# Patient Record
Sex: FEMALE | Race: WHITE | Employment: OTHER | URBAN - METROPOLITAN AREA
[De-identification: names, ages, dates, MRNs, and addresses within clinical notes are randomized per-mention and may not be internally consistent; named-entity substitution may affect disease eponyms.]

---

## 2017-08-10 ENCOUNTER — HOSPITAL ENCOUNTER (OUTPATIENT)
Dept: CT IMAGING | Age: 67
Discharge: HOME | End: 2017-08-10
Attending: SURGERY
Payer: MEDICARE

## 2017-08-10 VITALS — HEIGHT: 64 IN | WEIGHT: 162 LBS | BODY MASS INDEX: 27.66 KG/M2

## 2017-08-10 DIAGNOSIS — Z72.0 TOBACCO ABUSE: ICD-10-CM

## 2017-08-10 LAB — CREAT BLD-MCNC: 0.9 MG/DL (ref 0.6–1)

## 2017-08-10 PROCEDURE — 82565 ASSAY OF CREATININE: CPT

## 2017-08-10 PROCEDURE — 74011636320 HC RX REV CODE- 636/320: Performed by: SURGERY

## 2017-08-10 PROCEDURE — 74011000258 HC RX REV CODE- 258: Performed by: SURGERY

## 2017-08-10 PROCEDURE — 75635 CT ANGIO ABDOMINAL ARTERIES: CPT

## 2017-08-10 RX ORDER — SODIUM CHLORIDE 0.9 % (FLUSH) 0.9 %
10 SYRINGE (ML) INJECTION
Status: COMPLETED | OUTPATIENT
Start: 2017-08-10 | End: 2017-08-10

## 2017-08-10 RX ADMIN — IOPAMIDOL 125 ML: 755 INJECTION, SOLUTION INTRAVENOUS at 10:17

## 2017-08-10 RX ADMIN — Medication 10 ML: at 10:17

## 2017-08-10 RX ADMIN — SODIUM CHLORIDE 100 ML: 900 INJECTION, SOLUTION INTRAVENOUS at 10:17

## 2017-09-05 ENCOUNTER — HOSPITAL ENCOUNTER (OUTPATIENT)
Dept: LAB | Age: 67
Discharge: HOME | End: 2017-09-05
Payer: MEDICARE

## 2017-09-05 DIAGNOSIS — I75.021 ATHEROEMBOLISM OF RIGHT LOWER EXTREMITY (HCC): ICD-10-CM

## 2017-09-05 LAB
ANION GAP SERPL CALC-SCNC: 10 MMOL/L (ref 7–16)
BASOPHILS # BLD: 0 K/UL (ref 0–0.2)
BASOPHILS NFR BLD: 1 % (ref 0–2)
BUN SERPL-MCNC: 12 MG/DL (ref 8–23)
CALCIUM SERPL-MCNC: 9.3 MG/DL (ref 8.3–10.4)
CHLORIDE SERPL-SCNC: 104 MMOL/L (ref 98–107)
CO2 SERPL-SCNC: 28 MMOL/L (ref 21–32)
CREAT SERPL-MCNC: 0.98 MG/DL (ref 0.6–1)
DIFFERENTIAL METHOD BLD: ABNORMAL
EOSINOPHIL # BLD: 0.1 K/UL (ref 0–0.8)
EOSINOPHIL NFR BLD: 2 % (ref 0.5–7.8)
ERYTHROCYTE [DISTWIDTH] IN BLOOD BY AUTOMATED COUNT: 13.6 % (ref 11.9–14.6)
GLUCOSE SERPL-MCNC: 127 MG/DL (ref 65–100)
HCT VFR BLD AUTO: 42.5 % (ref 35.8–46.3)
HGB BLD-MCNC: 14.8 G/DL (ref 11.7–15.4)
IMM GRANULOCYTES # BLD: 0 K/UL (ref 0–0.5)
IMM GRANULOCYTES NFR BLD: 0.1 % (ref 0–5)
LYMPHOCYTES # BLD: 3 K/UL (ref 0.5–4.6)
LYMPHOCYTES NFR BLD: 41 % (ref 13–44)
MCH RBC QN AUTO: 33.7 PG (ref 26.1–32.9)
MCHC RBC AUTO-ENTMCNC: 34.8 G/DL (ref 31.4–35)
MCV RBC AUTO: 96.8 FL (ref 79.6–97.8)
MONOCYTES # BLD: 0.6 K/UL (ref 0.1–1.3)
MONOCYTES NFR BLD: 8 % (ref 4–12)
NEUTS SEG # BLD: 3.5 K/UL (ref 1.7–8.2)
NEUTS SEG NFR BLD: 48 % (ref 43–78)
PLATELET # BLD AUTO: 251 K/UL (ref 150–450)
PMV BLD AUTO: 10.8 FL (ref 10.8–14.1)
POTASSIUM SERPL-SCNC: 3.5 MMOL/L (ref 3.5–5.1)
RBC # BLD AUTO: 4.39 M/UL (ref 4.05–5.25)
SODIUM SERPL-SCNC: 142 MMOL/L (ref 136–145)
WBC # BLD AUTO: 7.2 K/UL (ref 4.3–11.1)

## 2017-09-05 PROCEDURE — 85025 COMPLETE CBC W/AUTO DIFF WBC: CPT | Performed by: SURGERY

## 2017-09-05 PROCEDURE — 36415 COLL VENOUS BLD VENIPUNCTURE: CPT | Performed by: SURGERY

## 2017-09-05 PROCEDURE — 80048 BASIC METABOLIC PNL TOTAL CA: CPT | Performed by: SURGERY

## 2017-09-12 ENCOUNTER — HOSPITAL ENCOUNTER (OUTPATIENT)
Age: 67
Setting detail: OUTPATIENT SURGERY
Discharge: HOME | End: 2017-09-12
Attending: SURGERY | Admitting: SURGERY
Payer: MEDICARE

## 2017-09-12 ENCOUNTER — APPOINTMENT (OUTPATIENT)
Dept: INTERVENTIONAL RADIOLOGY/VASCULAR | Age: 67
End: 2017-09-12
Attending: SURGERY
Payer: MEDICARE

## 2017-09-12 ENCOUNTER — ANESTHESIA EVENT (OUTPATIENT)
Dept: SURGERY | Age: 67
End: 2017-09-12
Payer: MEDICARE

## 2017-09-12 ENCOUNTER — ANESTHESIA (OUTPATIENT)
Dept: SURGERY | Age: 67
End: 2017-09-12
Payer: MEDICARE

## 2017-09-12 VITALS
OXYGEN SATURATION: 94 % | HEART RATE: 73 BPM | WEIGHT: 163 LBS | RESPIRATION RATE: 17 BRPM | BODY MASS INDEX: 27.98 KG/M2 | SYSTOLIC BLOOD PRESSURE: 150 MMHG | DIASTOLIC BLOOD PRESSURE: 73 MMHG | TEMPERATURE: 97.7 F

## 2017-09-12 DIAGNOSIS — I75.021 ATHEROEMBOLISM OF RIGHT LOWER EXTREMITY (HCC): ICD-10-CM

## 2017-09-12 PROBLEM — I70.211 ATHEROSCLEROSIS OF NATIVE ARTERIES OF EXTREMITIES WITH INTERMITTENT CLAUDICATION, RIGHT LEG (HCC): Status: ACTIVE | Noted: 2017-09-12

## 2017-09-12 PROBLEM — I77.1 ILIAC ARTERY STENOSIS, RIGHT (HCC): Status: ACTIVE | Noted: 2017-09-12

## 2017-09-12 LAB — ACT BLD: 114 SECS (ref 70–128)

## 2017-09-12 PROCEDURE — 76060000033 HC ANESTHESIA 1 TO 1.5 HR: Performed by: SURGERY

## 2017-09-12 PROCEDURE — C1876 STENT, NON-COA/NON-COV W/DEL: HCPCS

## 2017-09-12 PROCEDURE — C1894 INTRO/SHEATH, NON-LASER: HCPCS

## 2017-09-12 PROCEDURE — 74011250636 HC RX REV CODE- 250/636

## 2017-09-12 PROCEDURE — C1887 CATHETER, GUIDING: HCPCS

## 2017-09-12 PROCEDURE — C1769 GUIDE WIRE: HCPCS

## 2017-09-12 PROCEDURE — 77030032500 HC CATH SZ PIG PRFRM MRTM -B

## 2017-09-12 PROCEDURE — 74011250636 HC RX REV CODE- 250/636: Performed by: SURGERY

## 2017-09-12 PROCEDURE — 74011636320 HC RX REV CODE- 636/320: Performed by: SURGERY

## 2017-09-12 PROCEDURE — 85347 COAGULATION TIME ACTIVATED: CPT

## 2017-09-12 PROCEDURE — 77030013794 HC KT TRNSDUC BLD EDWD -B

## 2017-09-12 PROCEDURE — 37221 IR PLACE STNT ILIAC /  PTA INIT: CPT

## 2017-09-12 PROCEDURE — 74011000250 HC RX REV CODE- 250: Performed by: SURGERY

## 2017-09-12 PROCEDURE — 74011250637 HC RX REV CODE- 250/637: Performed by: ANESTHESIOLOGY

## 2017-09-12 PROCEDURE — 74011250636 HC RX REV CODE- 250/636: Performed by: ANESTHESIOLOGY

## 2017-09-12 RX ORDER — IODIXANOL 320 MG/ML
10-200 INJECTION, SOLUTION INTRAVASCULAR
Status: COMPLETED | OUTPATIENT
Start: 2017-09-12 | End: 2017-09-12

## 2017-09-12 RX ORDER — SODIUM CHLORIDE, SODIUM LACTATE, POTASSIUM CHLORIDE, CALCIUM CHLORIDE 600; 310; 30; 20 MG/100ML; MG/100ML; MG/100ML; MG/100ML
100 INJECTION, SOLUTION INTRAVENOUS CONTINUOUS
Status: CANCELLED | OUTPATIENT
Start: 2017-09-12

## 2017-09-12 RX ORDER — PROTAMINE SULFATE 10 MG/ML
INJECTION, SOLUTION INTRAVENOUS AS NEEDED
Status: DISCONTINUED | OUTPATIENT
Start: 2017-09-12 | End: 2017-09-12 | Stop reason: HOSPADM

## 2017-09-12 RX ORDER — MIDAZOLAM HYDROCHLORIDE 1 MG/ML
INJECTION, SOLUTION INTRAMUSCULAR; INTRAVENOUS AS NEEDED
Status: DISCONTINUED | OUTPATIENT
Start: 2017-09-12 | End: 2017-09-12 | Stop reason: HOSPADM

## 2017-09-12 RX ORDER — MORPHINE SULFATE 2 MG/ML
2 INJECTION, SOLUTION INTRAMUSCULAR; INTRAVENOUS
Status: DISCONTINUED | OUTPATIENT
Start: 2017-09-12 | End: 2017-09-12 | Stop reason: HOSPADM

## 2017-09-12 RX ORDER — SODIUM CHLORIDE 9 MG/ML
50 INJECTION, SOLUTION INTRAVENOUS CONTINUOUS
Status: DISCONTINUED | OUTPATIENT
Start: 2017-09-12 | End: 2017-09-12 | Stop reason: HOSPADM

## 2017-09-12 RX ORDER — ACETAMINOPHEN 325 MG/1
650 TABLET ORAL
Status: DISCONTINUED | OUTPATIENT
Start: 2017-09-12 | End: 2017-09-12 | Stop reason: HOSPADM

## 2017-09-12 RX ORDER — DIPHENHYDRAMINE HYDROCHLORIDE 50 MG/ML
12.5 INJECTION, SOLUTION INTRAMUSCULAR; INTRAVENOUS ONCE
Status: CANCELLED | OUTPATIENT
Start: 2017-09-12 | End: 2017-09-12

## 2017-09-12 RX ORDER — SODIUM CHLORIDE 0.9 % (FLUSH) 0.9 %
5-10 SYRINGE (ML) INJECTION AS NEEDED
Status: CANCELLED | OUTPATIENT
Start: 2017-09-12

## 2017-09-12 RX ORDER — OXYCODONE AND ACETAMINOPHEN 5; 325 MG/1; MG/1
1 TABLET ORAL AS NEEDED
Status: CANCELLED | OUTPATIENT
Start: 2017-09-12

## 2017-09-12 RX ORDER — CEFAZOLIN SODIUM IN 0.9 % NACL 2 G/50 ML
2 INTRAVENOUS SOLUTION, PIGGYBACK (ML) INTRAVENOUS ONCE
Status: COMPLETED | OUTPATIENT
Start: 2017-09-12 | End: 2017-09-12

## 2017-09-12 RX ORDER — OXYCODONE AND ACETAMINOPHEN 5; 325 MG/1; MG/1
1 TABLET ORAL
Status: DISCONTINUED | OUTPATIENT
Start: 2017-09-12 | End: 2017-09-12 | Stop reason: HOSPADM

## 2017-09-12 RX ORDER — MIDAZOLAM HYDROCHLORIDE 1 MG/ML
2 INJECTION, SOLUTION INTRAMUSCULAR; INTRAVENOUS ONCE
Status: DISCONTINUED | OUTPATIENT
Start: 2017-09-12 | End: 2017-09-12 | Stop reason: HOSPADM

## 2017-09-12 RX ORDER — SODIUM CHLORIDE 0.9 % (FLUSH) 0.9 %
5-10 SYRINGE (ML) INJECTION EVERY 8 HOURS
Status: DISCONTINUED | OUTPATIENT
Start: 2017-09-12 | End: 2017-09-12 | Stop reason: HOSPADM

## 2017-09-12 RX ORDER — MIDAZOLAM HYDROCHLORIDE 1 MG/ML
2 INJECTION, SOLUTION INTRAMUSCULAR; INTRAVENOUS
Status: DISCONTINUED | OUTPATIENT
Start: 2017-09-12 | End: 2017-09-12 | Stop reason: HOSPADM

## 2017-09-12 RX ORDER — OXYCODONE HYDROCHLORIDE 5 MG/1
5 TABLET ORAL
Status: CANCELLED | OUTPATIENT
Start: 2017-09-12

## 2017-09-12 RX ORDER — FAMOTIDINE 20 MG/1
20 TABLET, FILM COATED ORAL ONCE
Status: COMPLETED | OUTPATIENT
Start: 2017-09-12 | End: 2017-09-12

## 2017-09-12 RX ORDER — LIDOCAINE HYDROCHLORIDE 10 MG/ML
0.1 INJECTION INFILTRATION; PERINEURAL AS NEEDED
Status: DISCONTINUED | OUTPATIENT
Start: 2017-09-12 | End: 2017-09-12 | Stop reason: HOSPADM

## 2017-09-12 RX ORDER — SODIUM CHLORIDE, SODIUM LACTATE, POTASSIUM CHLORIDE, CALCIUM CHLORIDE 600; 310; 30; 20 MG/100ML; MG/100ML; MG/100ML; MG/100ML
100 INJECTION, SOLUTION INTRAVENOUS CONTINUOUS
Status: DISCONTINUED | OUTPATIENT
Start: 2017-09-12 | End: 2017-09-12 | Stop reason: HOSPADM

## 2017-09-12 RX ORDER — HEPARIN SODIUM 1000 [USP'U]/ML
INJECTION, SOLUTION INTRAVENOUS; SUBCUTANEOUS AS NEEDED
Status: DISCONTINUED | OUTPATIENT
Start: 2017-09-12 | End: 2017-09-12 | Stop reason: HOSPADM

## 2017-09-12 RX ORDER — PROPOFOL 10 MG/ML
INJECTION, EMULSION INTRAVENOUS
Status: DISCONTINUED | OUTPATIENT
Start: 2017-09-12 | End: 2017-09-12 | Stop reason: HOSPADM

## 2017-09-12 RX ORDER — SODIUM CHLORIDE 0.9 % (FLUSH) 0.9 %
5-10 SYRINGE (ML) INJECTION AS NEEDED
Status: DISCONTINUED | OUTPATIENT
Start: 2017-09-12 | End: 2017-09-12 | Stop reason: HOSPADM

## 2017-09-12 RX ORDER — FENTANYL CITRATE 50 UG/ML
25 INJECTION, SOLUTION INTRAMUSCULAR; INTRAVENOUS ONCE
Status: DISCONTINUED | OUTPATIENT
Start: 2017-09-12 | End: 2017-09-12 | Stop reason: HOSPADM

## 2017-09-12 RX ORDER — LIDOCAINE HYDROCHLORIDE 20 MG/ML
10-200 INJECTION, SOLUTION INFILTRATION; PERINEURAL ONCE
Status: COMPLETED | OUTPATIENT
Start: 2017-09-12 | End: 2017-09-12

## 2017-09-12 RX ORDER — HYDROMORPHONE HYDROCHLORIDE 2 MG/ML
0.5 INJECTION, SOLUTION INTRAMUSCULAR; INTRAVENOUS; SUBCUTANEOUS
Status: CANCELLED | OUTPATIENT
Start: 2017-09-12

## 2017-09-12 RX ORDER — HEPARIN SODIUM 200 [USP'U]/100ML
500 INJECTION, SOLUTION INTRAVENOUS
Status: DISCONTINUED | OUTPATIENT
Start: 2017-09-12 | End: 2017-09-12 | Stop reason: HOSPADM

## 2017-09-12 RX ORDER — SODIUM CHLORIDE 9 MG/ML
75 INJECTION, SOLUTION INTRAVENOUS CONTINUOUS
Status: DISCONTINUED | OUTPATIENT
Start: 2017-09-12 | End: 2017-09-12 | Stop reason: HOSPADM

## 2017-09-12 RX ORDER — HEPARIN SODIUM 200 [USP'U]/100ML
1000 INJECTION, SOLUTION INTRAVENOUS ONCE
Status: COMPLETED | OUTPATIENT
Start: 2017-09-12 | End: 2017-09-12

## 2017-09-12 RX ADMIN — FAMOTIDINE 20 MG: 20 TABLET ORAL at 07:08

## 2017-09-12 RX ADMIN — LIDOCAINE HYDROCHLORIDE 40 MG: 20 INJECTION, SOLUTION INFILTRATION; PERINEURAL at 07:51

## 2017-09-12 RX ADMIN — IODIXANOL 10 ML: 320 INJECTION, SOLUTION INTRAVASCULAR at 08:11

## 2017-09-12 RX ADMIN — CEFAZOLIN 2 G: 1 INJECTION, POWDER, FOR SOLUTION INTRAMUSCULAR; INTRAVENOUS; PARENTERAL at 07:35

## 2017-09-12 RX ADMIN — HEPARIN SODIUM 500 UNITS: 200 INJECTION, SOLUTION INTRAVENOUS at 08:00

## 2017-09-12 RX ADMIN — HEPARIN SODIUM 7400 UNITS: 1000 INJECTION, SOLUTION INTRAVENOUS; SUBCUTANEOUS at 07:57

## 2017-09-12 RX ADMIN — SODIUM CHLORIDE, SODIUM LACTATE, POTASSIUM CHLORIDE, AND CALCIUM CHLORIDE: 600; 310; 30; 20 INJECTION, SOLUTION INTRAVENOUS at 07:22

## 2017-09-12 RX ADMIN — HEPARIN SODIUM 2000 UNITS: 200 INJECTION, SOLUTION INTRAVENOUS at 07:51

## 2017-09-12 RX ADMIN — PROTAMINE SULFATE 60 MG: 10 INJECTION, SOLUTION INTRAVENOUS at 08:07

## 2017-09-12 RX ADMIN — MIDAZOLAM HYDROCHLORIDE 2 MG: 1 INJECTION, SOLUTION INTRAMUSCULAR; INTRAVENOUS at 07:22

## 2017-09-12 RX ADMIN — PROPOFOL 75 MCG/KG/MIN: 10 INJECTION, EMULSION INTRAVENOUS at 07:27

## 2017-09-12 NOTE — OP NOTES
[facility] 66   OPERATIVE REPORT       Name:  Roselyn Villegas   MR#:  [id number]   :  1950   Account #:  [de-identified]   Date of Adm:  2017       DATE OF SURGERY: 2017. PREPROCEDURE DIAGNOSIS: Peripheral arterial disease with right   common iliac artery stenosis and right lower extremity   atheroembolism. POSTOPERATIVE DIAGNOSIS: Peripheral arterial disease with right   common iliac artery stenosis and right lower extremity   atheroembolism. NAME OF PROCEDURE: Right common iliac artery angioplasty and   stent. SURGEON: Kristopher De Jesus MD.    ANESTHESIA: MAC.    ESTIMATED BLOOD LOSS: Less than 10 mL. CONTRAST DOSE: 12 mL of Visipaque. FLUOROSCOPY TIME: 1 minute 18 seconds. STATEMENT OF MEDICAL NECESSITY: The patient is a 61-year-old   woman who presented with atheroembolism to the right fifth toe. This has gradually healed and the pain has subsided. Imaging   indicates a high-grade, greater than 80% stenosis of the right   common iliac artery which appears to be the culprit lesion. DETAILS OF PROCEDURE: The patient was taken to the angio suite   in supine position. Monitored anesthesia care was provided. The   groins were prepped and draped in the usual sterile fashion. The   right common femoral artery was identified with a combination of   fluoroscopic marking of the right femoral head and direct   ultrasound-guided access. The overlying skin of the right groin   was anesthetized with 2% Xylocaine local. Using direct   ultrasound guidance and micropuncture technique, the right   common femoral artery was entered with a micropuncture needle   and then the micropuncture wire was advanced easily. Micropuncture catheter was placed through which an 0.035 Bentson   wire with his position and able to cross the stenosis and placed   into the aorta.  A 6-Persian by the 22 cm length sheath was placed   and positioned to the distal right common iliac artery. A 5-Singaporean marker flush catheter was placed through the sheath   over the wire and an injection through the sheath was performed   for arteriographic purposes. With a marker catheter in place,   length measurements were done to confirm the findings from the   CTA. A marker flush catheter was removed. The patient was given 100   units of heparin per kilogram body weight intravenously. Two minutes later, a 9 mm x 19 mm Omnilink balloon expandable   stent was placed into the sheath and positioned to the area of   stenosis which was approximately in the middle third of the   right common iliac artery. The balloon and stent were deployed   and waist could be seen at the stenotic area and there was good   arterial wall apposition. The balloon was deflated and removed   and a confirmatory arteriogram was performed which showed normal   results. The stenosis is resolved and there is no residual   stenosis or other abnormality. There is, however, a small   abdominal aortic aneurysm which was noted on the CT angiogram.      The devices were removed. The sheath was removed and a shorter   6-Singaporean sheath was placed. The patient was then given protamine   to reverse the heparin. Once this was done, the sheath was   pulled and manual compression held for hemostasis. ANGIOGRAPHIC FINDINGS:   1. 80-90% stenosis of the proximal right common iliac artery. The external iliac is otherwise normal. A small AAA is also   seen. The right internal iliac artery had some moderate   atherosclerotic changes. 2. Excellent angiographic result with 9 mm balloon expandable   stent as described above with no residual stenosis or other   abnormality noted. FINAL IMPRESSION: High-grade stenosis of the proximal right   common iliac artery. Good result with balloon expandable stent.         MD Lucia York / Davis   D:  09/12/2017   08:49   T:  09/12/2017   09:18   Job #:  [id number]

## 2017-09-12 NOTE — ANESTHESIA POSTPROCEDURE EVALUATION
Post-Anesthesia Evaluation and Assessment    Patient: Jannell Goldberg MRN: [id number]  SSN: [id number]    YOB: 1950  Age: 73 y.o. Sex: female       Cardiovascular Function/Vital Signs  Visit Vitals    /69    Pulse 67    Temp 36.5 °C (97.7 °F)    Resp 12    Wt 73.9 kg (163 lb)    SpO2 95%    Breastfeeding No    BMI 27.98 kg/m2       Patient is status post general anesthesia for Procedure(s):  IR  ANESTHESIA/ RIGHT LOWER EXTREMITY ARTERIOGRAM RIGHT ILIAC  INTERVENTION. Nausea/Vomiting: None    Postoperative hydration reviewed and adequate. Pain:  Pain Scale 1: Numeric (0 - 10) (09/12/17 0828)  Pain Intensity 1: 0 (09/12/17 0903)   Managed    Neurological Status:   Neuro (WDL): Within Defined Limits (09/12/17 0828)   At baseline    Mental Status and Level of Consciousness: Arousable    Pulmonary Status:   O2 Device: Room air (09/12/17 0903)   Adequate oxygenation and airway patent    Complications related to anesthesia: None    Post-anesthesia assessment completed.  No concerns    Signed By: Fidel Cueto MD     September 12, 2017

## 2017-09-12 NOTE — ANESTHESIA PREPROCEDURE EVALUATION
Anesthetic History   No history of anesthetic complications            Review of Systems / Medical History  Patient summary reviewed and pertinent labs reviewed    Pulmonary    COPD: mild      Smoker         Neuro/Psych   Within defined limits           Cardiovascular    Hypertension: well controlled          PAD and hyperlipidemia    Exercise tolerance: <4 METS     GI/Hepatic/Renal  Within defined limits              Endo/Other  Within defined limits           Other Findings            Physical Exam    Airway  Mallampati: II  TM Distance: 4 - 6 cm  Neck ROM: normal range of motion   Mouth opening: Normal     Cardiovascular  Regular rate and rhythm,  S1 and S2 normal,  no murmur, click, rub, or gallop  Rhythm: regular  Rate: normal         Dental    Dentition: Caps/crowns     Pulmonary  Breath sounds clear to auscultation               Abdominal  GI exam deferred       Other Findings            Anesthetic Plan    ASA: 3  Anesthesia type: total IV anesthesia          Induction: Intravenous  Anesthetic plan and risks discussed with: Patient

## 2017-09-12 NOTE — PROGRESS NOTES
TRANSFER - IN REPORT:    Verbal report received from Rodrigo Grossman CRNA (name) on Karen Horn  being received from IR procedure (unit) for routine progression of care, MAC recovery      Report consisted of patients Situation, Background, Assessment and   Recommendations(SBAR). Information from the following report(s) Procedure Summary and MAR was reviewed with the receiving nurse. Opportunity for questions and clarification was provided. Assessment completed upon patients arrival to unit and care assumed.

## 2017-09-12 NOTE — DISCHARGE INSTRUCTIONS
ANGIOGRAPHY DISCHARGE INSTRUCTIONS    1. Check puncture site frequently for swelling or bleeding. If there is any bleeding, lie down and apply pressure over the area with a clean towel or washcloth. Notify your doctor for any redness, swelling, drainage, or oozing from the puncture site. Notify your doctor for any fever or chills. 2. If the extremity becomes cold, numb, or painful call Dr. Albina Mendoza at [phone].  3. Activity should be limited for the next 48 hours. Climb stairs as little as possible and avoid any stooping, bending, or strenuous activity for 48 hours. No heavy lifting (anything over 10 pounds) for 3 days. 4. You may resume your usual diet. Drink more fluids than usual.  5. Have a responsible person drive you home and stay with you for at least 24 hours after your heart catheterization/angiography. 6. You may remove bandage from your Right and Groin in 48 hours. You may shower in 24 hours, but keep the dressing dry. No tub baths, hot tubs, or swimming for 1 week. Do not place any lotions, creams, powders, or ointments over puncture site for 1 week. You may place a clean band-aid over the puncture site each day for 5 days. Change daily. I have read the above instructions and have had the opportunity to ask questions. Patient: ________________________   Date: 9/12/2017    Witness: _______________________   Date: 9/12/2017     Interventional Radiology General Nurse Discharge    After general anesthesia or intravenous sedation, for 24 hours or while taking prescription Narcotics:  · Limit your activities  · Do not drive and operate hazardous machinery  · Do not make important personal or business decisions  · Do  not drink alcoholic beverages  · If you have not urinated within 8 hours after discharge, please contact your surgeon on call. * Please give a list of your current medications to your Primary Care Provider.   * Please update this list whenever your medications are discontinued, doses are     changed, or new medications (including over-the-counter products) are added. * Please carry medication information at all times in case of emergency situations. These are general instructions for a healthy lifestyle:    No smoking/ No tobacco products/ Avoid exposure to second hand smoke  Surgeon General's Warning:  Quitting smoking now greatly reduces serious risk to your health. Obesity, smoking, and sedentary lifestyle greatly increases your risk for illness  A healthy diet, regular physical exercise & weight monitoring are important for maintaining a healthy lifestyle    You may be retaining fluid if you have a history of heart failure or if you experience any of the following symptoms:  Weight gain of 3 pounds or more overnight or 5 pounds in a week, increased swelling in our hands or feet or shortness of breath while lying flat in bed. Please call your doctor as soon as you notice any of these symptoms; do not wait until your next office visit. Recognize signs and symptoms of STROKE:  F-face looks uneven    A-arms unable to move or move unevenly    S-speech slurred or non-existent    T-time-call 911 as soon as signs and symptoms begin-DO NOT go       Back to bed or wait to see if you get better-TIME IS BRAIN.

## 2017-09-12 NOTE — PROGRESS NOTES
IR Nurse Pre-Procedure Checklist Part 2          Consent signed: Yes    H&P complete:  Yes    Antibiotics: Yes    Airway/Mallampati Done: Not applicable    Shaved: Yes    Pregnancy Form:Not applicable    Patient Position: Yes    MD Side: Yes     Biopsy Worksheet: Not applicable    Specimen Medium: Not applicable

## 2017-09-12 NOTE — PROGRESS NOTES
Recovery period without difficulty. Pt alert and oriented and denies pain. Dressing is clean, dry, and intact. Reviewed discharge instructions with patient and daughter, both verbalized understanding. Pt escorted to lobby discharge area via wheelchair. Vital signs and Genesis score completed.

## 2017-09-12 NOTE — PROGRESS NOTES
Pulse checked and present in right groin and right DP. 6FR sheath removed at 8:50am. Manual pressure held for 20 minutes until hemostasis achieved. No hematoma or bleeding noted. Site checked with bedside nurse, Yaneth Henley RN and report given. Sterile dressing applied as ordered. Pulse re-checked post sheath pull and present. Patient instructed to keep right leg straight and to remain flat until bedrest is completed. Patient verbalized understanding of post procedure instructions.

## 2017-09-12 NOTE — IP AVS SNAPSHOT
[address] 
[phone] Patient: Jannell Goldberg MRN: [id number] :1950 Current Discharge Medication List  
  
CONTINUE these medications which have NOT CHANGED Dose & Instructions Dispensing Information Comments Morning Noon Evening Bedtime  
 aspirin delayed-release 81 mg tablet Your last dose was: Your next dose is:    
   
   
 Dose:  81 mg Take 81 mg by mouth nightly. Refills:  0  
     
   
   
   
  
 FISH OIL PO Your last dose was: Your next dose is:    
   
   
 Dose:  1 Cap Take 1 Cap by mouth daily. Refills:  0  
     
   
   
   
  
 hydroCHLOROthiazide 25 mg tablet Commonly known as:  HYDRODIURIL Your last dose was: Your next dose is:    
   
   
 Dose:  25 mg Take 25 mg by mouth daily. Refills:  0 MAGNESIUM PO Your last dose was: Your next dose is:    
   
   
 Dose:  1 Tab Take 1 Tab by mouth daily. Refills:  0  
     
   
   
   
  
 multivitamin tablet Commonly known as:  ONE A DAY Your last dose was: Your next dose is:    
   
   
 Dose:  1 Tab Take 1 Tab by mouth daily. Refills:  0 NORCO 5-325 mg per tablet Generic drug:  HYDROcodone-acetaminophen Your last dose was: Your next dose is: Take  by mouth every six (6) hours as needed for Pain. Refills:  0  
     
   
   
   
  
 pravastatin 20 mg tablet Commonly known as:  PRAVACHOL Your last dose was: Your next dose is:    
   
   
 Dose:  20 mg Take 20 mg by mouth daily. Refills:  0

## 2017-09-12 NOTE — PROGRESS NOTES
Patient hypertensive. Denies pain. Notified Dr Agatha Keene of blood pressure. He is okay to proceed with sheath pull.

## 2017-09-12 NOTE — IP AVS SNAPSHOT
[address] [facility] 
[phone] Patient: Jannell Goldberg MRN: [id number] :1950 You are allergic to the following No active allergies Recent Documentation Weight Breastfeeding? BMI OB Status Smoking Status 73.9 kg No 27.98 kg/m2 Postmenopausal Former Smoker Emergency Contacts Name Discharge Info Relation Home Work Mobile Uche Lugo  Child [2] [phone] Joshua Koehler  Spouse [3] [phone] About your hospitalization You were admitted on:  2017 You last received care in the:  [facility] Radiology [facility] You were discharged on:  2017 Unit phone number:  [phone] Why you were hospitalized Your primary diagnosis was: Atheroembolism Of Right Lower Extremity (Hcc) Your diagnoses also included:  Atherosclerosis Of Native Arteries Of Extremities With Intermittent Claudication, Right Leg (Hcc), Iliac Artery Stenosis, Right (Hcc) Providers Seen During Your Hospitalizations Provider Role Specialty Primary office phone Mimi Perez MD Attending Provider Vascular Surgery [phone] Your Primary Care Physician (PCP) Primary Care Physician Office Phone Office Fax FREDRICK MANCINI ** None ** ** None ** Follow-up Information Follow up With Details Comments Contact Info Amanuel Argueta MD     
  
Your Appointments 2017  8:30 AM EDT  
ESTABLISHED PATIENT with Mimi Perez MD  
[facility] [address] [place] [id number] [phone] Current Discharge Medication List  
  
CONTINUE these medications which have NOT CHANGED Dose & Instructions Dispensing Information Comments Morning Noon Evening Bedtime aspirin delayed-release 81 mg tablet Your last dose was: Your next dose is:    
   
   
 Dose:  81 mg Take 81 mg by mouth nightly. Refills:  0  
     
   
   
   
  
 FISH OIL PO Your last dose was: Your next dose is:    
   
   
 Dose:  1 Cap Take 1 Cap by mouth daily. Refills:  0  
     
   
   
   
  
 hydroCHLOROthiazide 25 mg tablet Commonly known as:  HYDRODIURIL Your last dose was: Your next dose is:    
   
   
 Dose:  25 mg Take 25 mg by mouth daily. Refills:  0 MAGNESIUM PO Your last dose was: Your next dose is:    
   
   
 Dose:  1 Tab Take 1 Tab by mouth daily. Refills:  0  
     
   
   
   
  
 multivitamin tablet Commonly known as:  ONE A DAY Your last dose was: Your next dose is:    
   
   
 Dose:  1 Tab Take 1 Tab by mouth daily. Refills:  0 NORCO 5-325 mg per tablet Generic drug:  HYDROcodone-acetaminophen Your last dose was: Your next dose is: Take  by mouth every six (6) hours as needed for Pain. Refills:  0  
     
   
   
   
  
 pravastatin 20 mg tablet Commonly known as:  PRAVACHOL Your last dose was: Your next dose is:    
   
   
 Dose:  20 mg Take 20 mg by mouth daily. Refills:  0 Discharge Instructions ANGIOGRAPHY DISCHARGE INSTRUCTIONS 1. Check puncture site frequently for swelling or bleeding. If there is any bleeding, lie down and apply pressure over the area with a clean towel or washcloth. Notify your doctor for any redness, swelling, drainage, or oozing from the puncture site. Notify your doctor for any fever or chills. 2. If the extremity becomes cold, numb, or painful call Dr. HENSON [facility] at [phone]. 
3. Activity should be limited for the next 48 hours.  Climb stairs as little as possible and avoid any stooping, bending, or strenuous activity for 48 hours. No heavy lifting (anything over 10 pounds) for 3 days. 4. You may resume your usual diet. Drink more fluids than usual. 
5. Have a responsible person drive you home and stay with you for at least 24 hours after your heart catheterization/angiography. 6. You may remove bandage from your Right and Groin in 48 hours. You may shower in 24 hours, but keep the dressing dry. No tub baths, hot tubs, or swimming for 1 week. Do not place any lotions, creams, powders, or ointments over puncture site for 1 week. You may place a clean band-aid over the puncture site each day for 5 days. Change daily. I have read the above instructions and have had the opportunity to ask questions. Patient: ________________________   Date: 9/12/2017 Witness: _______________________   Date: 9/12/2017 Interventional Radiology General Nurse Discharge After general anesthesia or intravenous sedation, for 24 hours or while taking prescription Narcotics: · Limit your activities · Do not drive and operate hazardous machinery · Do not make important personal or business decisions · Do  not drink alcoholic beverages · If you have not urinated within 8 hours after discharge, please contact your surgeon on call. * Please give a list of your current medications to your Primary Care Provider. * Please update this list whenever your medications are discontinued, doses are 
   changed, or new medications (including over-the-counter products) are added. * Please carry medication information at all times in case of emergency situations. These are general instructions for a healthy lifestyle: No smoking/ No tobacco products/ Avoid exposure to second hand smoke Surgeon General's Warning:  Quitting smoking now greatly reduces serious risk to your health. Obesity, smoking, and sedentary lifestyle greatly increases your risk for illness A healthy diet, regular physical exercise & weight monitoring are important for maintaining a healthy lifestyle You may be retaining fluid if you have a history of heart failure or if you experience any of the following symptoms:  Weight gain of 3 pounds or more overnight or 5 pounds in a week, increased swelling in our hands or feet or shortness of breath while lying flat in bed. Please call your doctor as soon as you notice any of these symptoms; do not wait until your next office visit. Recognize signs and symptoms of STROKE: 
F-face looks uneven A-arms unable to move or move unevenly S-speech slurred or non-existent T-time-call 911 as soon as signs and symptoms begin-DO NOT go Back to bed or wait to see if you get better-TIME IS BRAIN. Discharge Orders None Introducing [facility]! Trey Roldan introduces [employer] patient portal. Now you can access parts of your medical record, email your doctor's office, and request medication refills online. 1. In your internet browser, go to [email or web address] 2. Click on the First Time User? Click Here link in the Sign In box. You will see the New Member Sign Up page. 3. Enter your [employer] Access Code exactly as it appears below. You will not need to use this code after youve completed the sign-up process. If you do not sign up before the expiration date, you must request a new code. · [employer] Access Code: [email or web address] Expires: 10/29/2017  9:43 AM 
 
4. Enter the last four digits of your Social Security Number (xxxx) and Date of Birth (mm/dd/yyyy) as indicated and click Submit. You will be taken to the next sign-up page. 5. Create a [employer] ID. This will be your [employer] login ID and cannot be changed, so think of one that is secure and easy to remember. 6. Create a [employer] password. You can change your password at any time. 7. Enter your Password Reset Question and Answer. This can be used at a later time if you forget your password. 8. Enter your e-mail address. You will receive e-mail notification when new information is available in [address] 9. Click Sign Up. You can now view and download portions of your medical record. 10. Click the Download Summary menu link to download a portable copy of your medical information. If you have questions, please visit the Frequently Asked Questions section of the [employer] website. Remember, [employer] is NOT to be used for urgent needs. For medical emergencies, dial 911. Now available from your iPhone and Android! General Information Please provide this summary of care documentation to your next provider. Patient Signature:  ____________________________________________________________ Date:  ____________________________________________________________  
  
Corbin Snipe Provider Signature:  ____________________________________________________________ Date:  ____________________________________________________________

## 2017-09-12 NOTE — BRIEF OP NOTE
BRIEF OPERATIVE NOTE    Date of Procedure: 9/12/2017   Preoperative Diagnosis: Atheroembolism of right lower extremity (HCC) [I75.021]  Postoperative Diagnosis: * No post-op diagnosis entered *    Procedure(s):  IR  ANESTHESIA/ RIGHT LOWER EXTREMITY ARTERIOGRAM RIGHT ILIAC  INTERVENTION  Surgeon(s) and Role:     * Chau Skelton MD - Primary         Surgical Staff:  * No surgical staff found *  No case tracking events are documented in the log. Anesthesia: MAC   Estimated Blood Loss: < 10 mL  Specimens: * No specimens in log *   Findings: Focal, >80% stenosis right common iliac artery. Good result with stent. Complications: none  Implants: * No implants in log *    Access: R CFA US-guided, 6F sheath, manual comp.   Fluoro time: 1 min, 18 sec  Contrast: 12 mL visipaque

## 2019-08-09 PROBLEM — I73.9 PAD (PERIPHERAL ARTERY DISEASE) (HCC): Status: ACTIVE | Noted: 2019-08-09

## 2020-02-20 PROBLEM — F17.200 SMOKER: Status: ACTIVE | Noted: 2020-02-20

## 2020-10-08 ENCOUNTER — HOSPITAL ENCOUNTER (OUTPATIENT)
Dept: SURGERY | Age: 70
Discharge: HOME | End: 2020-10-08
Payer: MEDICARE

## 2020-10-08 VITALS
HEIGHT: 63 IN | DIASTOLIC BLOOD PRESSURE: 72 MMHG | HEART RATE: 66 BPM | BODY MASS INDEX: 25.16 KG/M2 | WEIGHT: 142 LBS | SYSTOLIC BLOOD PRESSURE: 151 MMHG | TEMPERATURE: 97.7 F | OXYGEN SATURATION: 98 % | RESPIRATION RATE: 17 BRPM

## 2020-10-08 LAB
ANION GAP SERPL CALC-SCNC: 3 MMOL/L (ref 7–16)
BUN SERPL-MCNC: 12 MG/DL (ref 8–23)
CALCIUM SERPL-MCNC: 10.1 MG/DL (ref 8.3–10.4)
CHLORIDE SERPL-SCNC: 99 MMOL/L (ref 98–107)
CO2 SERPL-SCNC: 33 MMOL/L (ref 21–32)
CREAT SERPL-MCNC: 0.85 MG/DL (ref 0.6–1)
ERYTHROCYTE [DISTWIDTH] IN BLOOD BY AUTOMATED COUNT: 12.6 % (ref 11.9–14.6)
GLUCOSE SERPL-MCNC: 87 MG/DL (ref 65–100)
HCT VFR BLD AUTO: 40.6 % (ref 35.8–46.3)
HGB BLD-MCNC: 13.9 G/DL (ref 11.7–15.4)
MCH RBC QN AUTO: 34.1 PG (ref 26.1–32.9)
MCHC RBC AUTO-ENTMCNC: 34.2 G/DL (ref 31.4–35)
MCV RBC AUTO: 99.5 FL (ref 79.6–97.8)
NRBC # BLD: 0 K/UL (ref 0–0.2)
PLATELET # BLD AUTO: 290 K/UL (ref 150–450)
PMV BLD AUTO: 10.1 FL (ref 9.4–12.3)
POTASSIUM SERPL-SCNC: 3.4 MMOL/L (ref 3.5–5.1)
RBC # BLD AUTO: 4.08 M/UL (ref 4.05–5.2)
SODIUM SERPL-SCNC: 135 MMOL/L (ref 136–145)
WBC # BLD AUTO: 7.1 K/UL (ref 4.3–11.1)

## 2020-10-08 PROCEDURE — 80048 BASIC METABOLIC PNL TOTAL CA: CPT

## 2020-10-08 PROCEDURE — 85027 COMPLETE CBC AUTOMATED: CPT

## 2020-10-08 RX ORDER — VARENICLINE TARTRATE 0.5 MG/1
0.5 TABLET, FILM COATED ORAL 2 TIMES DAILY
COMMUNITY
Start: 2020-05-26 | End: 2021-01-01

## 2020-10-08 RX ORDER — CALCIUM CARBONATE/VITAMIN D3 600 MG-125
TABLET ORAL DAILY
COMMUNITY

## 2020-10-08 NOTE — PERIOP NOTES
Patient verified name and     Order for consent was found in EHR and matches case posting; patient verified. Type II surgery, walk-in assessment complete. Labs per surgeon: cbc, bmp, and on dos glucose, pt/inr, and creatinine  Labs per anesthesia protocol: no additional  EKG: done today and acceptable per protocol    Patient aware that a negative Covid swab result is required to proceed with surgery;  appointments are made by the surgeon office and test should be collected 7 days prior to surgery. The testing center is located at the . [address], [place]. Hospital approved surgical skin cleanser and instructions given per hospital policy. Patient provided with and instructed on educational handouts including Guide to Surgery, Pain Management, Hand Hygiene, Blood Transfusion Education, and [facility] Brochure. Patient answered medical/surgical history questions at their best of ability. All prior to admission medications documented in [facility]. Original medication prescription bottle was visualized during patient appointment. Patient instructed to hold all vitamins 7 days prior to surgery and NSAIDS 5 days prior to surgery, patient verbalized understanding. Patient teach back successful and patient demonstrates knowledge of instructions.

## 2020-10-08 NOTE — PERIOP NOTES
Recent Results (from the past 12 hour(s))   EKG, 12 LEAD, INITIAL    Collection Time: 10/08/20  1:55 PM   Result Value Ref Range    Ventricular Rate 58 BPM    Atrial Rate 58 BPM    P-R Interval 148 ms    QRS Duration 84 ms    Q-T Interval 446 ms    QTC Calculation (Bezet) 437 ms    Calculated P Axis 65 degrees    Calculated R Axis 38 degrees    Calculated T Axis 43 degrees    Diagnosis       Sinus bradycardia  Otherwise normal ECG  No previous ECGs available     CBC W/O DIFF    Collection Time: 10/08/20  2:00 PM   Result Value Ref Range    WBC 7.1 4.3 - 11.1 K/uL    RBC 4.08 4.05 - 5.2 M/uL    HGB 13.9 11.7 - 15.4 g/dL    HCT 40.6 35.8 - 46.3 %    MCV 99.5 (H) 79.6 - 97.8 FL    MCH 34.1 (H) 26.1 - 32.9 PG    MCHC 34.2 31.4 - 35.0 g/dL    RDW 12.6 11.9 - 14.6 %    PLATELET 726 715 - 549 K/uL    MPV 10.1 9.4 - 12.3 FL    ABSOLUTE NRBC 0.00 0.0 - 0.2 K/uL   METABOLIC PANEL, BASIC    Collection Time: 10/08/20  2:00 PM   Result Value Ref Range    Sodium 135 (L) 136 - 145 mmol/L    Potassium 3.4 (L) 3.5 - 5.1 mmol/L    Chloride 99 98 - 107 mmol/L    CO2 33 (H) 21 - 32 mmol/L    Anion gap 3 (L) 7 - 16 mmol/L    Glucose 87 65 - 100 mg/dL    BUN 12 8 - 23 MG/DL    Creatinine 0.85 0.6 - 1.0 MG/DL    GFR est AA >60 >60 ml/min/1.73m2    GFR est non-AA >60 >60 ml/min/1.73m2    Calcium 10.1 8.3 - 10.4 MG/DL

## 2020-10-08 NOTE — PERIOP NOTES
PLEASE CONTINUE TAKING ALL PRESCRIPTION MEDICATIONS UP TO THE DAY OF SURGERY UNLESS OTHERWISE DIRECTED BELOW. DISCONTINUE all vitamins and supplements 7 days prior to surgery. DISCONTINUE Non-Steriodal Anti-Inflammatory (NSAIDS) such as Advil and Aleve 5 days prior to surgery. Home Medications to take  the day of surgery    aspirin 81 mg  Cyclosporine eye drops  pravastatin           Home Medications   to Hold   Hold hydrochlorothiazide day of surgery        Comments          *Visitor policy of 1 visitor per patient discussed. Please do not bring home medications with you on the day of surgery unless otherwise directed by your nurse. If you are instructed to bring home medications, please give them to your nurse as they will be administered by the nursing staff. If you have any questions, please call [address] [place] [address] [phone] or [facility] [phone]. A copy of this note was provided to the patient for reference.

## 2020-10-09 LAB
ATRIAL RATE: 58 BPM
CALCULATED P AXIS, ECG09: 65 DEGREES
CALCULATED R AXIS, ECG10: 38 DEGREES
CALCULATED T AXIS, ECG11: 43 DEGREES
DIAGNOSIS, 93000: NORMAL
P-R INTERVAL, ECG05: 148 MS
Q-T INTERVAL, ECG07: 446 MS
QRS DURATION, ECG06: 84 MS
QTC CALCULATION (BEZET), ECG08: 437 MS
VENTRICULAR RATE, ECG03: 58 BPM

## 2020-10-14 ENCOUNTER — ANESTHESIA EVENT (OUTPATIENT)
Dept: SURGERY | Age: 70
End: 2020-10-14
Payer: MEDICARE

## 2020-10-15 ENCOUNTER — HOSPITAL ENCOUNTER (OUTPATIENT)
Age: 70
Setting detail: OUTPATIENT SURGERY
Discharge: HOME | End: 2020-10-15
Attending: SURGERY | Admitting: SURGERY
Payer: MEDICARE

## 2020-10-15 ENCOUNTER — ANESTHESIA (OUTPATIENT)
Dept: SURGERY | Age: 70
End: 2020-10-15
Payer: MEDICARE

## 2020-10-15 ENCOUNTER — APPOINTMENT (OUTPATIENT)
Dept: INTERVENTIONAL RADIOLOGY/VASCULAR | Age: 70
End: 2020-10-15
Attending: SURGERY
Payer: MEDICARE

## 2020-10-15 VITALS
DIASTOLIC BLOOD PRESSURE: 70 MMHG | TEMPERATURE: 98.4 F | SYSTOLIC BLOOD PRESSURE: 154 MMHG | WEIGHT: 142 LBS | HEART RATE: 58 BPM | RESPIRATION RATE: 15 BRPM | BODY MASS INDEX: 25.15 KG/M2 | OXYGEN SATURATION: 96 %

## 2020-10-15 LAB
CREAT BLD-MCNC: 0.9 MG/DL (ref 0.8–1.5)
POTASSIUM BLD-SCNC: 3.2 MMOL/L (ref 3.5–5.1)

## 2020-10-15 PROCEDURE — 77030037100 HC DEV INFL ANGIO PRESTO DISP BARD -B

## 2020-10-15 PROCEDURE — C1894 INTRO/SHEATH, NON-LASER: HCPCS

## 2020-10-15 PROCEDURE — 74011250637 HC RX REV CODE- 250/637: Performed by: ANESTHESIOLOGY

## 2020-10-15 PROCEDURE — 82565 ASSAY OF CREATININE: CPT

## 2020-10-15 PROCEDURE — 74011250636 HC RX REV CODE- 250/636: Performed by: NURSE ANESTHETIST, CERTIFIED REGISTERED

## 2020-10-15 PROCEDURE — 76010000138 HC OR TIME 0.5 TO 1 HR: Performed by: SURGERY

## 2020-10-15 PROCEDURE — 74011250636 HC RX REV CODE- 250/636

## 2020-10-15 PROCEDURE — C1769 GUIDE WIRE: HCPCS

## 2020-10-15 PROCEDURE — 76210000028 HC REC RM PH II 4 TO 4.5 HR: Performed by: SURGERY

## 2020-10-15 PROCEDURE — 74011250636 HC RX REV CODE- 250/636: Performed by: NURSE PRACTITIONER

## 2020-10-15 PROCEDURE — 74011250636 HC RX REV CODE- 250/636: Performed by: ANESTHESIOLOGY

## 2020-10-15 PROCEDURE — C1725 CATH, TRANSLUMIN NON-LASER: HCPCS

## 2020-10-15 PROCEDURE — 74011000636 HC RX REV CODE- 636: Performed by: SURGERY

## 2020-10-15 PROCEDURE — 77030021532 HC CATH ANGI DX IMPRS MRTM -B

## 2020-10-15 PROCEDURE — C1887 CATHETER, GUIDING: HCPCS

## 2020-10-15 PROCEDURE — 76937 US GUIDE VASCULAR ACCESS: CPT

## 2020-10-15 PROCEDURE — 74011250636 HC RX REV CODE- 250/636: Performed by: SURGERY

## 2020-10-15 PROCEDURE — C1760 CLOSURE DEV, VASC: HCPCS

## 2020-10-15 PROCEDURE — 76060000032 HC ANESTHESIA 0.5 TO 1 HR: Performed by: SURGERY

## 2020-10-15 PROCEDURE — 75710 ARTERY X-RAYS ARM/LEG: CPT

## 2020-10-15 PROCEDURE — 84132 ASSAY OF SERUM POTASSIUM: CPT

## 2020-10-15 PROCEDURE — 74011000250 HC RX REV CODE- 250: Performed by: NURSE ANESTHETIST, CERTIFIED REGISTERED

## 2020-10-15 PROCEDURE — 2709999900 HC NON-CHARGEABLE SUPPLY: Performed by: SURGERY

## 2020-10-15 PROCEDURE — 76210000006 HC OR PH I REC 0.5 TO 1 HR: Performed by: SURGERY

## 2020-10-15 RX ORDER — SODIUM CHLORIDE, SODIUM LACTATE, POTASSIUM CHLORIDE, CALCIUM CHLORIDE 600; 310; 30; 20 MG/100ML; MG/100ML; MG/100ML; MG/100ML
75 INJECTION, SOLUTION INTRAVENOUS CONTINUOUS
Status: DISCONTINUED | OUTPATIENT
Start: 2020-10-15 | End: 2020-10-15 | Stop reason: HOSPADM

## 2020-10-15 RX ORDER — SODIUM CHLORIDE 0.9 % (FLUSH) 0.9 %
5-40 SYRINGE (ML) INJECTION EVERY 8 HOURS
Status: DISCONTINUED | OUTPATIENT
Start: 2020-10-15 | End: 2020-10-15 | Stop reason: HOSPADM

## 2020-10-15 RX ORDER — PROTAMINE SULFATE 10 MG/ML
INJECTION, SOLUTION INTRAVENOUS AS NEEDED
Status: DISCONTINUED | OUTPATIENT
Start: 2020-10-15 | End: 2020-10-15 | Stop reason: HOSPADM

## 2020-10-15 RX ORDER — ONDANSETRON 2 MG/ML
4 INJECTION INTRAMUSCULAR; INTRAVENOUS
Status: DISCONTINUED | OUTPATIENT
Start: 2020-10-15 | End: 2020-10-15 | Stop reason: HOSPADM

## 2020-10-15 RX ORDER — HEPARIN SODIUM 1000 [USP'U]/ML
INJECTION, SOLUTION INTRAVENOUS; SUBCUTANEOUS AS NEEDED
Status: DISCONTINUED | OUTPATIENT
Start: 2020-10-15 | End: 2020-10-15 | Stop reason: HOSPADM

## 2020-10-15 RX ORDER — OXYCODONE AND ACETAMINOPHEN 10; 325 MG/1; MG/1
1 TABLET ORAL AS NEEDED
Status: DISCONTINUED | OUTPATIENT
Start: 2020-10-15 | End: 2020-10-15 | Stop reason: HOSPADM

## 2020-10-15 RX ORDER — CEFAZOLIN SODIUM/WATER 2 G/20 ML
2 SYRINGE (ML) INTRAVENOUS ONCE
Status: COMPLETED | OUTPATIENT
Start: 2020-10-15 | End: 2020-10-15

## 2020-10-15 RX ORDER — OXYCODONE HYDROCHLORIDE 5 MG/1
5 TABLET ORAL
Status: DISCONTINUED | OUTPATIENT
Start: 2020-10-15 | End: 2020-10-15 | Stop reason: HOSPADM

## 2020-10-15 RX ORDER — SODIUM CHLORIDE 0.9 % (FLUSH) 0.9 %
5-40 SYRINGE (ML) INJECTION AS NEEDED
Status: DISCONTINUED | OUTPATIENT
Start: 2020-10-15 | End: 2020-10-15 | Stop reason: HOSPADM

## 2020-10-15 RX ORDER — OXYCODONE AND ACETAMINOPHEN 5; 325 MG/1; MG/1
1 TABLET ORAL
Status: DISCONTINUED | OUTPATIENT
Start: 2020-10-15 | End: 2020-10-15 | Stop reason: HOSPADM

## 2020-10-15 RX ORDER — PROPOFOL 10 MG/ML
INJECTION, EMULSION INTRAVENOUS AS NEEDED
Status: DISCONTINUED | OUTPATIENT
Start: 2020-10-15 | End: 2020-10-15 | Stop reason: HOSPADM

## 2020-10-15 RX ORDER — HYDROMORPHONE HYDROCHLORIDE 2 MG/ML
0.5 INJECTION, SOLUTION INTRAMUSCULAR; INTRAVENOUS; SUBCUTANEOUS
Status: DISCONTINUED | OUTPATIENT
Start: 2020-10-15 | End: 2020-10-15 | Stop reason: HOSPADM

## 2020-10-15 RX ORDER — HEPARIN SODIUM 200 [USP'U]/100ML
INJECTION, SOLUTION INTRAVENOUS AS NEEDED
Status: DISCONTINUED | OUTPATIENT
Start: 2020-10-15 | End: 2020-10-15 | Stop reason: HOSPADM

## 2020-10-15 RX ORDER — MIDAZOLAM HYDROCHLORIDE 1 MG/ML
2 INJECTION, SOLUTION INTRAMUSCULAR; INTRAVENOUS
Status: DISCONTINUED | OUTPATIENT
Start: 2020-10-15 | End: 2020-10-15 | Stop reason: HOSPADM

## 2020-10-15 RX ORDER — PROPOFOL 10 MG/ML
INJECTION, EMULSION INTRAVENOUS
Status: DISCONTINUED | OUTPATIENT
Start: 2020-10-15 | End: 2020-10-15 | Stop reason: HOSPADM

## 2020-10-15 RX ORDER — MORPHINE SULFATE 10 MG/ML
1 INJECTION, SOLUTION INTRAMUSCULAR; INTRAVENOUS
Status: DISCONTINUED | OUTPATIENT
Start: 2020-10-15 | End: 2020-10-15 | Stop reason: HOSPADM

## 2020-10-15 RX ORDER — ACETAMINOPHEN 325 MG/1
650 TABLET ORAL ONCE
Status: COMPLETED | OUTPATIENT
Start: 2020-10-15 | End: 2020-10-15

## 2020-10-15 RX ORDER — LIDOCAINE HYDROCHLORIDE 20 MG/ML
INJECTION, SOLUTION EPIDURAL; INFILTRATION; INTRACAUDAL; PERINEURAL AS NEEDED
Status: DISCONTINUED | OUTPATIENT
Start: 2020-10-15 | End: 2020-10-15 | Stop reason: HOSPADM

## 2020-10-15 RX ORDER — SODIUM CHLORIDE 9 MG/ML
75 INJECTION, SOLUTION INTRAVENOUS CONTINUOUS
Status: DISCONTINUED | OUTPATIENT
Start: 2020-10-15 | End: 2020-10-15 | Stop reason: HOSPADM

## 2020-10-15 RX ADMIN — ACETAMINOPHEN 650 MG: 325 TABLET, FILM COATED ORAL at 06:18

## 2020-10-15 RX ADMIN — SODIUM CHLORIDE, SODIUM LACTATE, POTASSIUM CHLORIDE, AND CALCIUM CHLORIDE 75 ML/HR: 600; 310; 30; 20 INJECTION, SOLUTION INTRAVENOUS at 06:18

## 2020-10-15 RX ADMIN — PROPOFOL 50 MG: 10 INJECTION, EMULSION INTRAVENOUS at 07:18

## 2020-10-15 RX ADMIN — PROTAMINE SULFATE 20 MG: 10 INJECTION, SOLUTION INTRAVENOUS at 07:48

## 2020-10-15 RX ADMIN — Medication 2 G: at 07:28

## 2020-10-15 RX ADMIN — PROPOFOL 200 MCG/KG/MIN: 10 INJECTION, EMULSION INTRAVENOUS at 07:18

## 2020-10-15 RX ADMIN — HEPARIN SODIUM 4000 UNITS: 1000 INJECTION, SOLUTION INTRAVENOUS; SUBCUTANEOUS at 07:36

## 2020-10-15 RX ADMIN — LIDOCAINE HYDROCHLORIDE 60 MG: 20 INJECTION, SOLUTION EPIDURAL; INFILTRATION; INTRACAUDAL; PERINEURAL at 07:18

## 2020-10-15 NOTE — PERIOP NOTES
Called waiting area and spoke with daughter to give an update on pt condition. Offered for family to come back and daughter wanted pt to sleep for now. She wants to remain in the waiting area at this time.

## 2020-10-15 NOTE — PERIOP NOTES
Maria Alejandra Johnson at bedside. He cleared pt to go home after assessing site. Sending surgery tech to reapply pressure dressing to pt prior to discharge.

## 2020-10-15 NOTE — PROGRESS NOTES
Pre-op prayer request received. Prayer and support given to patient and daughter. Rev. Jennie Aguirre MDIV

## 2020-10-15 NOTE — DISCHARGE INSTRUCTIONS
Arteriogram: What to Expect at [address] [place] [address] Drive inserted a thin, flexible tube (catheter) into a blood vessel in your groin. In some cases, the catheter is placed in a blood vessel in the arm. After an arteriogram, your groin or arm may have a bruise and feel sore for a day or two. You can do light activities around the house but nothing strenuous for several days. Your doctor may give you specific instructions on when you can do your normal activities again, such as driving and going back to work. This care sheet gives you a general idea about how long it will take for you to recover. But each person recovers at a different pace. Follow the steps below to feel better as quickly as possible. How can you care for yourself at home? Activity  · Do not do strenuous exercise and do not lift, pull, or push anything heavy until your doctor says it is okay. This may be for a day or two. You can walk around the house and do light activity, such as cooking. · You may shower 24 to 48 hours after the procedure, if your doctor okays it. Pat the incision dry. Do not take a bath for 1 week, or until your doctor tells you it is okay. · If the catheter was placed in your groin, try not to walk up stairs for the first couple of days. · If your doctor recommends it, get more exercise. Walking is a good choice. Bit by bit, increase the amount you walk every day. Try for at least 30 minutes on most days of the week. Diet  · Drink plenty of fluids to help your body flush out the dye. If you have kidney, heart, or liver disease and have to limit fluids, talk with your doctor before you increase the amount of fluids you drink. · You can eat your normal diet. If your stomach is upset, try bland, low-fat foods like plain rice, broiled chicken, toast, and yogurt. Medicines  · Be safe with medicines. Read and follow all instructions on the label.   ¨ If the doctor gave you a prescription medicine for pain, take it as prescribed. ¨ If you are not taking a prescription pain medicine, ask your doctor if you can take an over-the-counter medicine. · If you take blood thinners, such as warfarin (Coumadin), clopidogrel (Plavix), or aspirin, be sure to talk to your doctor. He or she will tell you if and when to start taking those medicines again. Make sure that you understand exactly what your doctor wants you to do. · Your doctor will tell you if and when you can restart your medicines. He or she will also give you instructions about taking any new medicines. Care of the catheter site  · You will have a dressing over the cut (incision). A dressing helps the incision heal and protects it. Your doctor will tell you how to take care of this. · Put ice or a cold pack on the area for 10 to 20 minutes at a time to help with soreness or swelling. Put a thin cloth between the ice and your skin. Follow-up care is a key part of your treatment and safety. Be sure to make and go to all appointments, and call your doctor if you are having problems. It's also a good idea to know your test results and keep a list of the medicines you take. When should you call for help? Call 911 anytime you think you may need emergency care. For example, call if:  · You passed out (lost consciousness). · You have severe trouble breathing. · You have sudden chest pain and shortness of breath, or you cough up blood. Call your doctor now or seek immediate medical care if:  · You are bleeding from the area where the catheter was put in your artery. · You have a fast-growing, painful lump at the catheter site. · You have signs of infection, such as:  ¨ Increased pain, swelling, warmth, or redness. ¨ Red streaks leading from the incision. ¨ Pus draining from the incision. ¨ A fever.   After general anesthesia or intravenous sedation, for 24 hours or while taking prescription Narcotics:  · Limit your activities  · A responsible adult needs to be with you for the next 24 hours  · Do not drive and operate hazardous machinery  · Do not make important personal or business decisions  · Do not drink alcoholic beverages  · If you have not urinated within 8 hours after discharge, and you are experiencing discomfort from urinary retention, please go to the nearest ED. · If you have sleep apnea and have a CPAP machine, please use it for all naps and sleeping. · Please use caution when taking narcotics and any of your home medications that may cause drowsiness. *  Please give a list of your current medications to your Primary Care Provider. *  Please update this list whenever your medications are discontinued, doses are      changed, or new medications (including over-the-counter products) are added. *  Please carry medication information at all times in case of emergency situations. These are general instructions for a healthy lifestyle:  No smoking/ No tobacco products/ Avoid exposure to second hand smoke  Surgeon General's Warning:  Quitting smoking now greatly reduces serious risk to your health. Obesity, smoking, and sedentary lifestyle greatly increases your risk for illness  A healthy diet, regular physical exercise & weight monitoring are important for maintaining a healthy lifestyle    You may be retaining fluid if you have a history of heart failure or if you experience any of the following symptoms:  Weight gain of 3 pounds or more overnight or 5 pounds in a week, increased swelling in our hands or feet or shortness of breath while lying flat in bed. Please call your doctor as soon as you notice any of these symptoms; do not wait until your next office visit.

## 2020-10-15 NOTE — OP NOTES
[facility] 
OPERATIVE REPORT Name:  Thea Julio 
MR#:  [id number] :  1950 ACCOUNT #:  [[id number]] DATE OF SERVICE:  10/15/2020 CLINICAL SERVICE:  Vascular Surgery PREOPERATIVE DIAGNOSIS:  Right iliac in-stent stenosis with right hip pain. POSTOPERATIVE DIAGNOSIS:  Right iliac in-stent stenosis with right hip pain. PROCEDURES PERFORMED: 
1. Ultrasound-guided access of the right common femoral artery with placement of catheter in the aorta for aortogram. 
2.  Right iliac angioplasty with a 9 x 4 balloon. SURGEON:  Tomas Melendez MD 
 
ASSISTANT:   
 
ANESTHESIA:  Sedation. COMPLICATIONS:  
 
SPECIMENS REMOVED:  
 
IMPLANTS:  
 
ESTIMATED BLOOD LOSS:   
 
OPERATIVE FINDINGS: 
1. There was a small aortic aneurysm measuring 3.3 cm. 
2.  Right iliac stent was patent. However, the proximal end was not fully apposed with some narrowing proximally about 50%. The left iliac was patent without any in-stent disease. 3.  Bilateral external and common femoral arteries are all patent. INDICATIONS FOR PROCEDURE:  This is a 52-year-old female with a history of right iliac angioplasty with stent back in 2017, from a symptomatic atherosclerotic disease. She presented to my office with worsening pain in the right hip. Ultrasound showed she had some mid 50% iliac stenosis with distal biphasic flow. Secondary to the symptoms, we elected to proceed with the procedure. PROCEDURE:  After getting informed consent, the patient was brought to the operating room. Anesthesia was then induced. Preoperative antibiotics were given before skin incision. The patient's right groin was prepped and draped in normal sterile fashion. Using micropuncture Seldinger technique, we used ultrasound to access the common femoral artery over the femoral head. We upsized to a 6-Stateless sheath over an 0.035 starter wire. We did a digital subtraction with the ANDREWS projection to span the stent. This showed that the proximal end of the stent was not fully apposed to the common iliac artery. Once we got a wire through, we heparinized the patient with 4000 units of heparin. We first ballooned it with a 9 x 4 balloon to a nominal pressure. It completely apposed the stent. There was no evidence of any dissection or thrombosis. We were happy with the procedure. We removed the wire and the catheter, and did a sheath shot showing the external iliac artery and the common femoral artery were patent. We closed with a Mynx device. The patient was then extubated and taken to the PACU in stable condition. Lynnette Mccain MD 
 
 
[id number] 
D:  10/15/2020 8:05 
T:  10/15/2020 13:59 
JOB #:  [id number]

## 2020-10-15 NOTE — ANESTHESIA POSTPROCEDURE EVALUATION
Procedure(s): ULTRASOUND GUIDED ACCESS RIGHT LOWER EXTREMITY ARTERIOGRAM ILIAC PTA. general 
 
Anesthesia Post Evaluation Multimodal analgesia: multimodal analgesia used between 6 hours prior to anesthesia start to PACU discharge Patient location during evaluation: PACU Patient participation: complete - patient participated Level of consciousness: awake Pain management: adequate Airway patency: patent Anesthetic complications: no 
Cardiovascular status: acceptable Respiratory status: acceptable Hydration status: acceptable Post anesthesia nausea and vomiting:  none Final Post Anesthesia Temperature Assessment:  Normothermia (36.0-37.5 degrees C) INITIAL Post-op Vital signs:  
Vitals Value Taken Time /65 10/15/2020  8:32 AM  
Temp 36.4 °C (97.5 °F) 10/15/2020  8:01 AM  
Pulse 51 10/15/2020  8:35 AM  
Resp 15 10/15/2020  8:12 AM  
SpO2 95 % 10/15/2020  8:35 AM  
Vitals shown include unvalidated device data.

## 2020-10-15 NOTE — BRIEF OP NOTE
[address]  [address], [address] FAX: [phone] Brief Op Note Template Note Pre-Op Diagnosis: PAD (peripheral artery disease) ([facility] [address] [I73.9] Iliac artery stenosis, right (HCC) [I77.1] Post-Op Diagnosis:  PAD (peripheral artery disease) ([facility] [address] [I73.9] Procedures: Procedure(s): ULTRASOUND GUIDED ACCESS RIGHT LOWER EXTREMITY ARTERIOGRAM ILIAC PTA Surgeon: Samuel Matute MD 
 
Assistants: Surgeon(s): Modesta Blanco MD   
 
Anesthesia:  General  
 
Findings: Proximal stent not fully opposed to iliac artery balloon angioplasty wire #4 balloon Tourniquet Time:  * No tourniquets in log * Estimated Blood Loss:       
      
Specimens:  
        
Implants:  * No implants in log * Complications: None Signed: Samuel Matute MD 
   
Elements of this note have been dictated using speech recognition software. As a result, errors of speech recognition may have occurred.

## 2020-10-15 NOTE — ANESTHESIA PREPROCEDURE EVALUATION
Relevant Problems No relevant active problems Anesthetic History Review of Systems / Medical History Patient summary reviewed and pertinent labs reviewed Pulmonary COPD: moderate Smoker Neuro/Psych Cardiovascular Hypertension PAD Exercise tolerance: <4 METS Comments: Previous iliac stent 2017 GI/Hepatic/Renal 
Within defined limits Endo/Other Within defined limits Other Findings Physical Exam 
 
Airway Mallampati: II 
TM Distance: > 6 cm Neck ROM: normal range of motion Mouth opening: Normal 
 
 Cardiovascular Rhythm: regular Dental 
 
Dentition: Caps/crowns Pulmonary Abdominal 
GI exam deferred Other Findings Anesthetic Plan ASA: 3 Anesthesia type: total IV anesthesia Induction: Intravenous Anesthetic plan and risks discussed with: Patient

## 2020-10-15 NOTE — PERIOP NOTES
11:20 pt noted a bump above the right arterial closure device when she finished voiding in the bathroom. Pt was assisted back to the stretcher and placed in supine position with HOB flat 11:23 Manual pressure applied to a hematoma that was superior and both medial and lateral to the closure device. No bleeding or bruising noted. Manual pressure was held for 20 minutes The hematoma reduced well. BP 140s-150s/50s-60s on five minute frequency, O2 sat 100% during manual pressure 11:44 pt remains supine with HOB flat. Pt will remain on bedrest with vital sign monitoring to ensure no new hematoma development 11:59 upon right groin check, noted small hematoma (10% the size of 11:20 hematoma) superior to closure device Manual pressure held for 20m 
12:19 hematoma reduced well. Will continue to monitor.

## (undated) DEVICE — GOWN,PREVENTION PLUS,2XL,ST,22/CS: Brand: MEDLINE